# Patient Record
Sex: FEMALE | Race: WHITE
[De-identification: names, ages, dates, MRNs, and addresses within clinical notes are randomized per-mention and may not be internally consistent; named-entity substitution may affect disease eponyms.]

---

## 2017-04-28 ENCOUNTER — HOSPITAL ENCOUNTER (OUTPATIENT)
Dept: HOSPITAL 62 - LC | Age: 22
Discharge: HOME | End: 2017-04-28
Attending: OBSTETRICS & GYNECOLOGY
Payer: MEDICAID

## 2017-04-28 DIAGNOSIS — Z3A.34: ICD-10-CM

## 2017-04-28 DIAGNOSIS — Z36: ICD-10-CM

## 2017-04-28 DIAGNOSIS — Z34.93: Primary | ICD-10-CM

## 2017-04-28 PROCEDURE — 4A1HXCZ MONITORING OF PRODUCTS OF CONCEPTION, CARDIAC RATE, EXTERNAL APPROACH: ICD-10-PCS | Performed by: OBSTETRICS & GYNECOLOGY

## 2017-04-28 PROCEDURE — 59025 FETAL NON-STRESS TEST: CPT

## 2017-05-02 ENCOUNTER — HOSPITAL ENCOUNTER (OUTPATIENT)
Dept: HOSPITAL 62 - LC | Age: 22
Discharge: HOME | End: 2017-05-02
Attending: OBSTETRICS & GYNECOLOGY
Payer: MEDICAID

## 2017-05-02 DIAGNOSIS — Z34.83: Primary | ICD-10-CM

## 2017-05-02 PROCEDURE — 59025 FETAL NON-STRESS TEST: CPT

## 2017-05-31 ENCOUNTER — HOSPITAL ENCOUNTER (OUTPATIENT)
Dept: HOSPITAL 62 - LC | Age: 22
Discharge: HOME | End: 2017-05-31
Attending: OBSTETRICS & GYNECOLOGY
Payer: MEDICAID

## 2017-05-31 DIAGNOSIS — O24.415: Primary | ICD-10-CM

## 2017-05-31 DIAGNOSIS — Z3A.38: ICD-10-CM

## 2017-05-31 LAB
ALBUMIN SERPL-MCNC: 3.5 G/DL (ref 3.5–5)
ALP SERPL-CCNC: 235 U/L (ref 38–126)
ALT SERPL-CCNC: 47 U/L (ref 9–52)
ANION GAP SERPL CALC-SCNC: 8 MMOL/L (ref 5–19)
APPEARANCE UR: (no result)
AST SERPL-CCNC: 30 U/L (ref 14–36)
BARBITURATES UR QL SCN: NEGATIVE
BASOPHILS # BLD AUTO: 0 10^3/UL (ref 0–0.2)
BASOPHILS NFR BLD AUTO: 0.2 % (ref 0–2)
BILIRUB DIRECT SERPL-MCNC: 0.3 MG/DL (ref 0–0.4)
BILIRUB SERPL-MCNC: 0.4 MG/DL (ref 0.2–1.3)
BILIRUB UR QL STRIP: NEGATIVE
BUN SERPL-MCNC: 9 MG/DL (ref 7–20)
CALCIUM: 9.6 MG/DL (ref 8.4–10.2)
CHLORIDE SERPL-SCNC: 106 MMOL/L (ref 98–107)
CO2 SERPL-SCNC: 21 MMOL/L (ref 22–30)
CREAT SERPL-MCNC: 0.68 MG/DL (ref 0.52–1.25)
CREAT UR-MCNC: 77 MG/DL (ref 16–327)
EOSINOPHIL # BLD AUTO: 0.1 10^3/UL (ref 0–0.6)
EOSINOPHIL NFR BLD AUTO: 0.7 % (ref 0–6)
ERYTHROCYTE [DISTWIDTH] IN BLOOD BY AUTOMATED COUNT: 13.8 % (ref 11.5–14)
GLUCOSE SERPL-MCNC: 73 MG/DL (ref 75–110)
GLUCOSE UR STRIP-MCNC: NEGATIVE MG/DL
HCT VFR BLD CALC: 35.7 % (ref 36–47)
HGB BLD-MCNC: 11.7 G/DL (ref 12–15.5)
HGB HCT DIFFERENCE: -0.6
KETONES UR STRIP-MCNC: NEGATIVE MG/DL
LDH1 SERPL-CCNC: 391 U/L (ref 313–618)
LYMPHOCYTES # BLD AUTO: 1.7 10^3/UL (ref 0.5–4.7)
LYMPHOCYTES NFR BLD AUTO: 17.8 % (ref 13–45)
MCH RBC QN AUTO: 30.5 PG (ref 27–33.4)
MCHC RBC AUTO-ENTMCNC: 32.8 G/DL (ref 32–36)
MCV RBC AUTO: 93 FL (ref 80–97)
METHADONE UR QL SCN: NEGATIVE
MONOCYTES # BLD AUTO: 0.7 10^3/UL (ref 0.1–1.4)
MONOCYTES NFR BLD AUTO: 7.4 % (ref 3–13)
NEUTROPHILS # BLD AUTO: 7 10^3/UL (ref 1.7–8.2)
NEUTS SEG NFR BLD AUTO: 73.9 % (ref 42–78)
NITRITE UR QL STRIP: NEGATIVE
PCP UR QL SCN: NEGATIVE
PH UR STRIP: 7 [PH] (ref 5–9)
POTASSIUM SERPL-SCNC: 4.7 MMOL/L (ref 3.6–5)
PROT SERPL-MCNC: 6.7 G/DL (ref 6.3–8.2)
PROT UR STRIP-MCNC: 28 MG/DL (ref ?–12)
PROT UR STRIP-MCNC: NEGATIVE MG/DL
RBC # BLD AUTO: 3.84 10^6/UL (ref 3.72–5.28)
SODIUM SERPL-SCNC: 135.2 MMOL/L (ref 137–145)
SP GR UR STRIP: 1.01
URATE SERPL-MCNC: 5.8 MG/DL (ref 2.5–6.2)
URINE OPIATES LOW: NEGATIVE
UROBILINOGEN UR-MCNC: NEGATIVE MG/DL (ref ?–2)
WBC # BLD AUTO: 9.5 10^3/UL (ref 4–10.5)

## 2017-05-31 PROCEDURE — 83615 LACTATE (LD) (LDH) ENZYME: CPT

## 2017-05-31 PROCEDURE — 4A1HXCZ MONITORING OF PRODUCTS OF CONCEPTION, CARDIAC RATE, EXTERNAL APPROACH: ICD-10-PCS | Performed by: OBSTETRICS & GYNECOLOGY

## 2017-05-31 PROCEDURE — 82570 ASSAY OF URINE CREATININE: CPT

## 2017-05-31 PROCEDURE — 59025 FETAL NON-STRESS TEST: CPT

## 2017-05-31 PROCEDURE — 81001 URINALYSIS AUTO W/SCOPE: CPT

## 2017-05-31 PROCEDURE — 80307 DRUG TEST PRSMV CHEM ANLYZR: CPT

## 2017-05-31 PROCEDURE — 36415 COLL VENOUS BLD VENIPUNCTURE: CPT

## 2017-05-31 PROCEDURE — 84156 ASSAY OF PROTEIN URINE: CPT

## 2017-05-31 PROCEDURE — 80053 COMPREHEN METABOLIC PANEL: CPT

## 2017-05-31 PROCEDURE — 85025 COMPLETE CBC W/AUTO DIFF WBC: CPT

## 2017-05-31 PROCEDURE — 84550 ASSAY OF BLOOD/URIC ACID: CPT

## 2017-05-31 NOTE — NON STRESS TEST REPORT
=================================================================

Non Stress Test

=================================================================

Datetime Report Generated by CPN: 05/31/2017 16:57

   

   

=================================================================

DEMOGRAPHIC

=================================================================

   

EGA NST:  34.4

EGA NST:  34.0

   

=================================================================

INDICATION

=================================================================

   

Indication for Study:  Diabetes Mellitus; Ordered by Provider

Indication for Study:  Diabetes Mellitus

Indication for Study (NST) Other:  on Glyburide

Indication for Study (NST) Other:  GDM on Glyburide

   

=================================================================

VITAL SIGNS

=================================================================

   

Temperature - NST:  98.4

Pulse - NST:  100

RESP - NST:  18

NBPSYS NST:  127

NBPDIA NST:  81

   

=================================================================

MONITORING

=================================================================

   

Monitor Explained:  Monitor Explained; Test Explained; Patient

   Verbalized Understanding

Monitor Explained:  Monitor Explained; Test Explained; Patient

   Verbalized Understanding

Time on Monitor:  05/02/2017 10:41

Time on Monitor:  04/28/2017 16:56

Time off Monitor:  05/02/2017 11:01

Time off Monitor:  04/28/2017 17:09

NST Duration:  20

NST Duration:  13

   

=================================================================

NST INTERVENTIONS

=================================================================

   

NST Interventions:  PO Hydration

NST Interventions:  PO Hydration; For Biophysical Profile

Physician Notified NST:  Dr Mao

Physician Notified NST:   Mohan

BABY A:  [id number]

   

=================================================================

BABY A

=================================================================

   

Fetal Movement :  Present

Fetal Movement :  Present

Contraction Frequency :  none

Contraction Frequency :  occasional

FHR Baseline :  145

FHR Baseline :  150

Accelerations :  15X15

Accelerations :  15X15

Decelerations :  None

Decelerations :  None

Variability :  Moderate 6-25bpm

Variability :  Moderate 6-25bpm

NST Review:  Meets Criteria for Reactive NST

NST Review:  Meets Criteria for Reactive NST

NST Review and Verified By :  TRACEY Dean RN

NST Review and Verified By :  Dr. Preston

NST Results:  Reactive

NST Results:  Reactive

   

=================================================================

NST REPORT

=================================================================

   

Report Trigger:  Send Report

Report Trigger:  Send Report

## 2017-06-06 ENCOUNTER — HOSPITAL ENCOUNTER (INPATIENT)
Dept: HOSPITAL 62 - LC | Age: 22
LOS: 2 days | Discharge: HOME | End: 2017-06-08
Attending: OBSTETRICS & GYNECOLOGY | Admitting: OBSTETRICS & GYNECOLOGY
Payer: MEDICAID

## 2017-06-06 DIAGNOSIS — D62: ICD-10-CM

## 2017-06-06 DIAGNOSIS — Z3A.39: ICD-10-CM

## 2017-06-06 DIAGNOSIS — O34.593: ICD-10-CM

## 2017-06-06 DIAGNOSIS — E66.9: ICD-10-CM

## 2017-06-06 DIAGNOSIS — N85.8: ICD-10-CM

## 2017-06-06 LAB
APPEARANCE UR: (no result)
BARBITURATES UR QL SCN: NEGATIVE
BASOPHILS # BLD AUTO: 0 10^3/UL (ref 0–0.2)
BASOPHILS NFR BLD AUTO: 0.3 % (ref 0–2)
BILIRUB UR QL STRIP: NEGATIVE
EOSINOPHIL # BLD AUTO: 0.1 10^3/UL (ref 0–0.6)
EOSINOPHIL NFR BLD AUTO: 0.4 % (ref 0–6)
ERYTHROCYTE [DISTWIDTH] IN BLOOD BY AUTOMATED COUNT: 14.1 % (ref 11.5–14)
GLUCOSE UR STRIP-MCNC: NEGATIVE MG/DL
HCT VFR BLD CALC: 34.1 % (ref 36–47)
HGB BLD-MCNC: 11.3 G/DL (ref 12–15.5)
HGB HCT DIFFERENCE: -0.2
KETONES UR STRIP-MCNC: NEGATIVE MG/DL
LYMPHOCYTES # BLD AUTO: 1 10^3/UL (ref 0.5–4.7)
LYMPHOCYTES NFR BLD AUTO: 8.4 % (ref 13–45)
MCH RBC QN AUTO: 30.4 PG (ref 27–33.4)
MCHC RBC AUTO-ENTMCNC: 33 G/DL (ref 32–36)
MCV RBC AUTO: 92 FL (ref 80–97)
METHADONE UR QL SCN: NEGATIVE
MONOCYTES # BLD AUTO: 0.6 10^3/UL (ref 0.1–1.4)
MONOCYTES NFR BLD AUTO: 4.6 % (ref 3–13)
NEUTROPHILS # BLD AUTO: 10.7 10^3/UL (ref 1.7–8.2)
NEUTS SEG NFR BLD AUTO: 86.3 % (ref 42–78)
NITRITE UR QL STRIP: NEGATIVE
PCP UR QL SCN: NEGATIVE
PH UR STRIP: 7 [PH] (ref 5–9)
PROT UR STRIP-MCNC: NEGATIVE MG/DL
RBC # BLD AUTO: 3.7 10^6/UL (ref 3.72–5.28)
SP GR UR STRIP: 1.01
URINE OPIATES LOW: NEGATIVE
UROBILINOGEN UR-MCNC: NEGATIVE MG/DL (ref ?–2)
WBC # BLD AUTO: 12.4 10^3/UL (ref 4–10.5)

## 2017-06-06 PROCEDURE — 81005 URINALYSIS: CPT

## 2017-06-06 PROCEDURE — 86850 RBC ANTIBODY SCREEN: CPT

## 2017-06-06 PROCEDURE — 85025 COMPLETE CBC W/AUTO DIFF WBC: CPT

## 2017-06-06 PROCEDURE — 99465 NB RESUSCITATION: CPT

## 2017-06-06 PROCEDURE — 0KQM0ZZ REPAIR PERINEUM MUSCLE, OPEN APPROACH: ICD-10-PCS | Performed by: OBSTETRICS & GYNECOLOGY

## 2017-06-06 PROCEDURE — 4A1HXCZ MONITORING OF PRODUCTS OF CONCEPTION, CARDIAC RATE, EXTERNAL APPROACH: ICD-10-PCS | Performed by: OBSTETRICS & GYNECOLOGY

## 2017-06-06 PROCEDURE — 88307 TISSUE EXAM BY PATHOLOGIST: CPT

## 2017-06-06 PROCEDURE — 86901 BLOOD TYPING SEROLOGIC RH(D): CPT

## 2017-06-06 PROCEDURE — 85027 COMPLETE CBC AUTOMATED: CPT

## 2017-06-06 PROCEDURE — 86592 SYPHILIS TEST NON-TREP QUAL: CPT

## 2017-06-06 PROCEDURE — 80307 DRUG TEST PRSMV CHEM ANLYZR: CPT

## 2017-06-06 PROCEDURE — 86900 BLOOD TYPING SEROLOGIC ABO: CPT

## 2017-06-06 PROCEDURE — 36415 COLL VENOUS BLD VENIPUNCTURE: CPT

## 2017-06-06 RX ADMIN — FERROUS SULFATE TAB 325 MG (65 MG ELEMENTAL FE) SCH MG: 325 (65 FE) TAB at 18:51

## 2017-06-06 RX ADMIN — DOCUSATE SODIUM SCH MG: 100 CAPSULE, LIQUID FILLED ORAL at 18:51

## 2017-06-06 RX ADMIN — IBUPROFEN SCH MG: 800 TABLET, FILM COATED ORAL at 21:05

## 2017-06-06 NOTE — ADMISSION PHYSICAL
=================================================================



=================================================================

Datetime Report Generated by CPN: 2017 17:55

   

   

=================================================================

CURRENT ADMISSION

=================================================================

   

Indication for Induction:  Maternal Diabetes

Admit Plan:  Admit to Unit; Initiate Labor Induction Protocol

   

=================================================================

ALLERGIES

=================================================================

   

Medication Allergies:  No

Medication Allergies:  No Known Allergies (2017)

Medication Allergies:  No Known Allergies (2017)

Medication Allergies:  No Known Allergies (2015)

Latex:  No Latex Allergies

Food Allergies:  None

Environmental Allergies:  None

   

=================================================================

OBSTETRICAL HISTORY

=================================================================

   

EDC:  2017 00:00

:  1

Para:  0

Term:  0

:  0

SAB:  0

IAB:  0

Ectopic:  0

Livin

Cesareans:  0

VBACs:  0

Multiple Births:  0

Gestational Diabetes:  Yes

Rh Sensitization:  No

Incompetent Cervix:  No

POP:  No

Infertility:  No

ART Treatment:  No

Uterine Anomaly:  No

IUGR:  No

Hx Previous C/S:  No

Macrosomia:  No

Hx Loss/Stillborn:  No

PIH:  No

Hx  Death:  No

Placenta Previa/Abruption:  No

Depression/PP Depression:  No

PTL/PROM:  No

Post Partum Hemorrhage:  No

Current Pregnancy Procedures:  Ultrasound; NST

Obstetrical History Comments:  G1 - GDM on glyburide

      

   

=================================================================

***SEE PRENATAL RECORDS***

=================================================================

   

Alcohol:  No

Marijuana :  No

Cocaine:  No

Other Illicit Drugs:  No

Cigarettes:  Never Smoker. [id number]

   

=================================================================

MEDICAL HISTORY

=================================================================

   

Diabetes:  No

Blood Transfusion:  No

Pulmonary Disease (Asthma, TB):  No

Breast Disease:  No

Hypertension:  No

Gyn Surgery:  No

Heart Disease:  No

Hosp/Surgery:  No

Autoimmune Disorder:  No

Anesthetic Complications:  No

Kidney Disease:  No

Abnormal Pap Smear:  No

Neuro/Epilepsy:  No

Psychiatric Disorders:  No

Other Medical Diseases:  No

Hepatitis/Liver Disease:  No

Significant Family History:  No

Varicosities/Phlebitis:  No

Trauma/Violence :  No

Thyroid Dysfunction:  No

   

=================================================================

INFECTIOUS HISTORY

=================================================================

   

Gonorrhea:  No

Genital Herpes:  No

Chlamydia:  No

Tuberculosis:  No

Syphilis:  No

Hepatitis:  No

HIV/AIDS Exposure:  No

Rash or Viral Illness:  No

HPV:  No

   

=================================================================

PHYSICAL EXAM

=================================================================

   

General:  Normal

HEENT:  Normal

Neurologic:  Normal

Thyroid:  Normal

Heart:  Normal

Lungs:  Normal

Breast:  Normal

Back:  Normal

Abdomen:  Normal

Genitourinary Exam:  Normal

Extremities:  Normal

DTRs:  Normal

Pelvic Type:  Adequate

   

=================================================================

VAGINAL EXAM

=================================================================

   

Dilatation:  5

Effacement:  80

Station:  -1

   

=================================================================

MEMBRANES

=================================================================

   

Membranes:  Ruptured

Amniotic Fluid Color:  Clear

   

=================================================================

FETUS A

=================================================================

   

Monitoring:  External US

Fetal Presentation:  Vertex

Admit Comment:  see progress note

   

=================================================================

PLANS FOR LABOR AND DELIVERY

=================================================================

   

Feeding Preference:  Breast

Benefit of Breast Feed Discussed:  Yes

Circumcision:  No

   

=================================================================

INFORMED CONSENT

=================================================================

   

Informed Consent Obtained:  Vaginal Delivery; Induction of Labor;

   Risks, Benefits and Alternatives Discussed

Assignment:  Sharon Preston MD

Signature:  Electronically signed by Ramiro Hercules CNM on 2017 at

   12:06  with User ID: [email or web address]

:  Electronically signed by Ramiro Hercules CNM on 2017 at 12:06  with

   User ID: [email or web address]

## 2017-06-06 NOTE — DELIVERY SUMMARY
=================================================================

Del Sum A-C

=================================================================

Datetime Report Generated by CPN: 2017 17:07

   

   

=================================================================

DELIVERY PERSONNEL

=================================================================

   

DELIVERY PERSONNEL:  [id number]

Delivery Doctor::  Ramiro Hercules CNM

Nurse Midwife Certified::  Ramiro Hercules CNM

Labor and Delivery Nurse::  Holley Samaniego RN

Labor and Delivery Nurse::  CLAUDIA Arciniega/RADHA:  Carmen Camacho CNA II

   

=================================================================

MATERNAL INFORMATION

=================================================================

   

Delivery Anesthesia:  None

Medications After Delivery:  Pitocin Drip 20 Units/1000ml NSS

Estimated  Blood Loss (ml):  300

Provider Comments:  no epidural 

   pt pushing well

   delivery of head

   nuchal x 2 tight

   reduced 

   delivery of infant - boggy uterus

   JEAN

   infant to bed

   nursery nurse called in

   nurses at bedside resuscitating infant

   infant to NICU

   apgar 1/1

   2nd degree vaginal laceration repaired with 1% lidocaine 2.0 chromic

   gut

    cc 

   fundus firm 

   hemostasis achieved

      

   

=================================================================

LABOR SUMMARY

=================================================================

   

EDC:  2017 00:00

No. Babies in Womb:  1

 Attempted:  No

   

=================================================================

LABOR INFORMATION

=================================================================

   

Reason for Induction:  Maternal Diabetes

Onset of Labor:  2017 11:45

Complete Dilatation:  2017 13:45

Oxytocin:  Induction

Group B Beta Strep:  Negative

Steroids Given:  None

Reason Steroids Not Administered:  Not Applicable

   

=================================================================

MEMBRANES

=================================================================

   

Membranes Rupture Method:  Spontaneous

Rupture of Membranes:  2017 11:45

Length of Rupture (hr):  3.47

Amniotic Fluid Color:  Clear

Amniotic Fluid Amount:  Moderate

Amniotic Fluid Odor:  Normal

   

=================================================================

STAGES OF LABOR

=================================================================

   

Stage 1 hr:  2

Stage 1 min:  0

Stage 2 hr:  1

Stage 2 min:  28

Stage 3 hr:  0

Stage 3 min:  2

Total Time in Labor hr:  3

Total Time in Labor min:  30

   

=================================================================

VAGINAL DELIVERY

=================================================================

   

Episiotomy:  None

Laceration Extension:  Second Degree

Laceration Type:  Vaginal

Laceration Repair:  Yes

Sponge Count Correct:  N/A

   

=================================================================

CSECTION DELIVERY

=================================================================

   

Primary Indication:  N/A

Secondary Indication:  N/A

CSection Incidence:  N/A

Labor:  N/A

Elective:  N/A

CSection Incision:  N/A

   

=================================================================

BABY A INFORMATION

=================================================================

   

Infant Delivery Date/Time:  2017 15:13

Method of Delivery:  Vaginal

Born in Route :  No

:  N/A

Forceps:  N/A

Vacuum Extraction:  N/A

Shoulder Dystocia :  No

   

=================================================================

PRESENTATION/POSITION BABY A

=================================================================

   

Presentation:  Cephalic

Cephalic Presentation:  Vertex

Vertex Position:  Left Occipital Anterior

Breech Presentation:  N/A

   

=================================================================

PLACENTA INFORMATION BABY A

=================================================================

   

Placenta Delivery Time :  2017 15:15

Placenta Method of Delivery:  Spontaneous

Placenta Status:  Delivered

   

=================================================================

APGAR SCORES BABY A

=================================================================

   

Heart Rate 1 min:  Slow, Below 100 bpm

Resp Effort 1 min:  Absent

Reflex Irritability 1 min:  No Response

Muscle Tone 1 min:  Flaccid

Color 1 min:  Blue/Pale

Resuscitation Effort 1 min:  Tactile Stimulation

APGAR SCORE 1 MIN:  1

Heart Rate 5 min:  Slow, Below 100 bpm

Resp Effort 5 min:  Absent

Reflex Irritability 5 min:  No Response

Muscle Tone 5 min:  Flaccid

Color 5 min:  Blue/Pale

Resuscitation Effort 5 min:  Tactile Stimulation; Oxygen

APGAR SCORE 5 MIN:  1

Heart Rate 10 min:  >100 bpm

Resp Effort 10 min:  Good Cry

Reflex Irritability 10 min:  Cough or Sneeze or Pulls Away

Muscle Tone 10 min:  Some Flexion of Extremities

Color 10 min:  Body Pink, Extremities Blue

Resuscitation Effort 10 min:  Oxygen; PPV/NCPAP

   (Annotations: Data stored by CPN on behalf of user)

APGAR SCORE 10 MIN:  8

   

=================================================================

INFANT INFORMATION BABY A

=================================================================

   

Gestational Age at Delivery:  39.4

Gestational Status:  Full Term- 39- 40.6 Weeks

Infant Outcome :  Liveborn

Infant Condition :  Stable

Infant Sex:  Male

   

=================================================================

IDENTIFICATION BABY A

=================================================================

   

Infant Verification Date/Time:  2017 15:29

ID Band Number:  [id number]

Mother's Name Verified:  Yes

Infant Medical Record Number:  [id number]

RN Verifying Infant:  D Bellavance RNC/K Aden CNM

   

=================================================================

WEIGHT/LENGTH BABY A

=================================================================

   

Infant Birthweight (gm):  3600

Infant Weight (lb):  7

Infant Weight (oz):  15

   

=================================================================

CORD INFORMATION BABY A

=================================================================

   

No. Cord Vessels:  3

Nuchal Cord :  Around Neck x2, Tight

Cord Blood Taken:  Yes-For Storage (Mom's Blood type +)

Infant Suction:  Mouth; Nose

   

=================================================================

ASSESSMENT BABY A

=================================================================

   

Physical Findings at Delivery:  Within Normal Limits

Skin to Skin:  No

Neonatologist/ALS Called :  Yes

Infant Care By:  K. Aden RNC, A. Babine RN

Transferred To:  NICU

   

=================================================================

BABY B INFORMATION

=================================================================

   

 :  N/A

   

=================================================================

SIGNATURES

=================================================================

   

Assignment:  Sharon Preston MD

Signature:  Electronically signed by Ramiro Hercules CNM on 2017 at

   16:10  with User ID: [email or web address]

:  Electronically signed by Ramiro Hercules CNM on 2017 at 16:10  with

   User ID: [email or web address]

## 2017-06-06 NOTE — L&D PROGRESS NOTES
=================================================================

PROGRESS NOTES

=================================================================

Datetime Report Generated by CPN: 2017 12:05

   

   

=================================================================

PROGRESS NOTE

=================================================================

   

Impression:  Normal Progression of Labor

Plan:  Continue Present Management; Induction

Informed Consent Obtained:  Vaginal Delivery; Induction of Labor;

   Risks, Benefits and Alternatives Discussed

Vital Signs :  Reviewed

Comment:  22 yo  here for induction GDM well controlled with

   glyburide 1.25 mg po bid

   EDC 17

   EGA 39.5

   Obesity

   Late PNC

   pt reports SROM- cervical exam  copious amount of fluid- no

   abdominal tenderness

   vss

   pitocin at 20 milliunits/ min

   plan of care reviewed

   anticipate 

   

=================================================================

VAGINAL EXAM

=================================================================

   

Dilatation:  5

Effacement:  80

Station:  -1

   

=================================================================

MEMBRANES

=================================================================

   

Membranes:  Ruptured

Amniotic Fluid Color:  Clear

   

=================================================================

FETUS A

=================================================================

   

FHR - Baseline:  130

Monitoring:  External US

Variability:  Moderate 6-25bpm

Accelerations:  15X15

Decelerations:  None

FHR Category:  Category I

:  39.5

   

=================================================================

SIGNATURE

=================================================================

   

SIGNATURE:  [id number];[id number]

SIGNATURE:  [id number]

Assignment:  Sharon Preston MD

Signature:  Electronically signed by Ramiro Hercules CNM on 2017 at

   12:05  with User ID: [email or web address]

:  Electronically signed by Ramiro eHrcules CNM on 2017 at 12:05  with

   User ID: [email or web address]

## 2017-06-07 LAB
ERYTHROCYTE [DISTWIDTH] IN BLOOD BY AUTOMATED COUNT: 14.5 % (ref 11.5–14)
HCT VFR BLD CALC: 28.9 % (ref 36–47)
HGB BLD-MCNC: 9.6 G/DL (ref 12–15.5)
HGB HCT DIFFERENCE: -0.1
MCH RBC QN AUTO: 30.4 PG (ref 27–33.4)
MCHC RBC AUTO-ENTMCNC: 33.3 G/DL (ref 32–36)
MCV RBC AUTO: 91 FL (ref 80–97)
RBC # BLD AUTO: 3.17 10^6/UL (ref 3.72–5.28)
WBC # BLD AUTO: 13.4 10^3/UL (ref 4–10.5)

## 2017-06-07 RX ADMIN — FERROUS SULFATE TAB 325 MG (65 MG ELEMENTAL FE) SCH MG: 325 (65 FE) TAB at 18:43

## 2017-06-07 RX ADMIN — IBUPROFEN SCH MG: 800 TABLET, FILM COATED ORAL at 05:16

## 2017-06-07 RX ADMIN — FERROUS SULFATE TAB 325 MG (65 MG ELEMENTAL FE) SCH MG: 325 (65 FE) TAB at 09:30

## 2017-06-07 RX ADMIN — IBUPROFEN SCH MG: 800 TABLET, FILM COATED ORAL at 13:24

## 2017-06-07 RX ADMIN — IBUPROFEN SCH MG: 800 TABLET, FILM COATED ORAL at 21:12

## 2017-06-07 RX ADMIN — PRENATAL W/O A VIT W/ FE FUMARATE-FA CAP 106.5-1 MG SCH CAP: 106.5 CAPSULE ORAL at 09:29

## 2017-06-07 RX ADMIN — SENNOSIDES, DOCUSATE SODIUM SCH EACH: 50; 8.6 TABLET, FILM COATED ORAL at 09:30

## 2017-06-07 RX ADMIN — DOCUSATE SODIUM SCH MG: 100 CAPSULE, LIQUID FILLED ORAL at 09:30

## 2017-06-07 RX ADMIN — DOCUSATE SODIUM SCH MG: 100 CAPSULE, LIQUID FILLED ORAL at 18:43

## 2017-06-07 NOTE — PDOC PROGRESS REPORT
Subjective-OB


Subjective: 


Post Delivery Day: 1








21 year old.  Denies any needs at this time states lochia is stable, pain is 

well controlled, voiding without difficulty.  








Physical Exam (OB)


Vital Signs: 


 











Temp Pulse Resp BP Pulse Ox


 


 97.9 F   82   16   133/80 H  100 


 


 06/07/17 08:08  06/07/17 08:08  06/07/17 08:08  06/07/17 08:08  06/07/17 08:08








 Intake & Output











 06/06/17 06/07/17 06/08/17





 06:59 06:59 06:59


 


Intake Total  600 


 


Balance  600 


 


Weight  106.7 kg 














- PIH/Pre-Eclampsia


Clonus: Negative


Headache: Absent


Epigastric Pain: No


Visual Changes: No





- Lochia


Lochia Amount: Scant < 10 ml


Lochia Color: Rubra/Red





- Abdomen


Description: Tender


Hernia Present: Yes


Fundal Description: Firm, Midline


Fundal Height: u/u - u/2





Objective-Diagnostic


Laboratory: 


 





 06/07/17 06:15 





 











  06/07/17





  06:15


 


WBC  13.4 H


 


RBC  3.17 L


 


Hgb  9.6 L


 


Hct  28.9 L


 


MCV  91


 


MCH  30.4


 


MCHC  33.3


 


RDW  14.5 H


 


Plt Count  167














Assessment and Plan(PN)





- Assessment and Plan


(1) Vaginal delivery


Is this a current diagnosis for this admission?: YesPlan: 


routine pp care








(2) Acute blood loss anemia


Is this a current diagnosis for this admission?: YesPlan: 


ferrous sulfate


increase dietary iron











- Time Spent with Patient


Time with patient: Less than 15 minutes


Critical Time spent with patient: Less than 15 minutes


Medications reviewed and adjusted accordingly: Yes





- Disposition


Anticipated Discharge: Home


Within: within 24 hours

## 2017-06-08 VITALS — DIASTOLIC BLOOD PRESSURE: 73 MMHG | SYSTOLIC BLOOD PRESSURE: 132 MMHG

## 2017-06-08 RX ADMIN — FERROUS SULFATE TAB 325 MG (65 MG ELEMENTAL FE) SCH MG: 325 (65 FE) TAB at 09:25

## 2017-06-08 RX ADMIN — DOCUSATE SODIUM SCH MG: 100 CAPSULE, LIQUID FILLED ORAL at 09:25

## 2017-06-08 RX ADMIN — SENNOSIDES, DOCUSATE SODIUM SCH EACH: 50; 8.6 TABLET, FILM COATED ORAL at 09:25

## 2017-06-08 RX ADMIN — PRENATAL W/O A VIT W/ FE FUMARATE-FA CAP 106.5-1 MG SCH CAP: 106.5 CAPSULE ORAL at 09:25

## 2017-06-08 RX ADMIN — IBUPROFEN SCH MG: 800 TABLET, FILM COATED ORAL at 05:57

## 2017-06-08 NOTE — PDOC DISCHARGE SUMMARY
Final Diagnosis


Discharge Date: 17





- Final Diagnosis


(1) Obesity complicating pregnancy


Is this a current diagnosis for this admission?: Yes





(2) Late prenatal care


Is this a current diagnosis for this admission?: Yes





(3) Gestational diabetes mellitus


Is this a current diagnosis for this admission?: Yes





(4) Vaginal delivery


Is this a current diagnosis for this admission?: Yes





(5) Acute blood loss anemia


Is this a current diagnosis for this admission?: Yes








Discharge Data





- Discharge Medication


Home Medications: 








Prenatal Vit/Iron Fumarate/FA [Prenatal Tablet] 1 tab PO DAILY 17 


Acetaminophen [Tylenol] 325 mg PO PRN PRN 17 


Diphenhydramine HCl [Benadryl] 25 mg PO PRN PRN 17 








Gestational Age: 39.4


Reason(s) for Admission: Induction of Labor, Gestional Diabetes


Prenatal Procedures: NST, Ultrasound


Intrapartum Procedure(s): Spontaneous Vaginal Delivery


Postpartum Complication(s): Laceration-Vaginal


Laceration-Degree: 2nd





-  Data


  ** Baby 1 Male


Apgar at 1 minute: 1


Apgar at 5 minutes: 1


Apgar at 10 minutes: 8


Weight: 3.6 kg


Home with Mother: No


Complications: Yes - baby in NICU





- Diagnosis Test


Laboratory: 


 











Temp Pulse Resp BP Pulse Ox


 


 98.0 F   85   20   132/73 H  99 


 


 17 07:55  17 07:55  17 07:55  17 07:55  17 07:55








 











  17





  07:35 08:14 06:15


 


RBC   3.70 L  3.17 L


 


Hgb   11.3 L  9.6 L


 


Hct   34.1 L  28.9 L


 


Urine Opiates Screen  NEGATIVE  














- Discharge information/Instructions


Discharge Activity: Activity As Tolerated, No Lifting Over 10 Pounds, No Lifting

/Push/Pulling, Pelvic Rest


Discharge Diet: As Tolerated, Regular


Disposition: HOME, SELF-CARE


Follow up with: [facility]


in: 4, Weeks

## 2017-06-08 NOTE — PDOC PROGRESS REPORT
Subjective-OB


Subjective: 


Post Delivery Day:








21 year old.  Denies any needs at this time 


Doing well, breast feeding, baby in NICU, scant bleeding, eating and voiding 

well





Physical Exam (OB)


Vital Signs: 


 











Temp Pulse Resp BP Pulse Ox


 


 98.0 F   85   20   132/73 H  99 


 


 06/08/17 07:55  06/08/17 07:55  06/08/17 07:55  06/08/17 07:55  06/08/17 07:55








 Intake & Output











 06/07/17 06/08/17 06/09/17





 06:59 06:59 06:59


 


Intake Total 600  


 


Balance 600  


 


Weight 106.7 kg  














- PIH/Pre-Eclampsia


Clonus: Negative


Headache: Absent


Epigastric Pain: No


Visual Changes: No





- Lochia


Lochia Amount: Scant < 10 ml


Lochia Color: Rubra/Red





- Abdomen


Description: Tender, Firm, Soft


Hernia Present: No


Fundal Description: Firm, Midline


Fundal Height: u/u - u/2





Objective-Diagnostic


Laboratory: 


 





 06/07/17 06:15 











Assessment and Plan(PN)





- Assessment and Plan


(1) Obesity complicating pregnancy


Qualifiers: 


     Trimester: first trimester        Qualified Code(s): O99.211 - Obesity 

complicating pregnancy, first trimester  


Is this a current diagnosis for this admission?: Yes





(2) Late prenatal care


Is this a current diagnosis for this admission?: Yes





(3) Gestational diabetes mellitus


Qualifiers: 


     Gestational diabetes mellitus control: oral hypoglycemic-controlled


Is this a current diagnosis for this admission?: Yes





(4) Vaginal delivery


Is this a current diagnosis for this admission?: Yes





(5) Acute blood loss anemia


Is this a current diagnosis for this admission?: Yes








- Time Spent with Patient


Time with patient: Less than 15 minutes


Medications reviewed and adjusted accordingly: Yes





- Disposition


Anticipated Discharge: Home


Within: Other - home today

## 2017-08-01 ENCOUNTER — HOSPITAL ENCOUNTER (OUTPATIENT)
Dept: HOSPITAL 62 - OD | Age: 22
End: 2017-08-01
Attending: ADVANCED PRACTICE MIDWIFE
Payer: MEDICAID

## 2017-08-01 DIAGNOSIS — N91.2: Primary | ICD-10-CM

## 2017-08-01 PROCEDURE — 84702 CHORIONIC GONADOTROPIN TEST: CPT

## 2017-08-01 PROCEDURE — 36415 COLL VENOUS BLD VENIPUNCTURE: CPT

## 2019-10-18 ENCOUNTER — HOSPITAL ENCOUNTER (INPATIENT)
Dept: HOSPITAL 62 - LC | Age: 24
LOS: 2 days | Discharge: HOME | End: 2019-10-20
Attending: OBSTETRICS & GYNECOLOGY | Admitting: OBSTETRICS & GYNECOLOGY
Payer: MEDICAID

## 2019-10-18 DIAGNOSIS — O48.0: Primary | ICD-10-CM

## 2019-10-18 DIAGNOSIS — D62: ICD-10-CM

## 2019-10-18 DIAGNOSIS — Z3A.41: ICD-10-CM

## 2019-10-18 DIAGNOSIS — E66.9: ICD-10-CM

## 2019-10-18 LAB
ADD MANUAL DIFF: NO
APPEARANCE UR: (no result)
APTT PPP: YELLOW S
BARBITURATES UR QL SCN: NEGATIVE
BASOPHILS # BLD AUTO: 0 10^3/UL (ref 0–0.2)
BASOPHILS NFR BLD AUTO: 0.3 % (ref 0–2)
BILIRUB UR QL STRIP: NEGATIVE
EOSINOPHIL # BLD AUTO: 0 10^3/UL (ref 0–0.6)
EOSINOPHIL NFR BLD AUTO: 0.3 % (ref 0–6)
ERYTHROCYTE [DISTWIDTH] IN BLOOD BY AUTOMATED COUNT: 14 % (ref 11.5–14)
GLUCOSE UR STRIP-MCNC: NEGATIVE MG/DL
HCT VFR BLD CALC: 32.9 % (ref 36–47)
HGB BLD-MCNC: 10.9 G/DL (ref 12–15.5)
KETONES UR STRIP-MCNC: NEGATIVE MG/DL
LYMPHOCYTES # BLD AUTO: 1.2 10^3/UL (ref 0.5–4.7)
LYMPHOCYTES NFR BLD AUTO: 10.9 % (ref 13–45)
MCH RBC QN AUTO: 29.5 PG (ref 27–33.4)
MCHC RBC AUTO-ENTMCNC: 33.3 G/DL (ref 32–36)
MCV RBC AUTO: 89 FL (ref 80–97)
METHADONE UR QL SCN: NEGATIVE
MONOCYTES # BLD AUTO: 0.8 10^3/UL (ref 0.1–1.4)
MONOCYTES NFR BLD AUTO: 7.1 % (ref 3–13)
NEUTROPHILS # BLD AUTO: 9.3 10^3/UL (ref 1.7–8.2)
NEUTS SEG NFR BLD AUTO: 81.4 % (ref 42–78)
NITRITE UR QL STRIP: NEGATIVE
PCP UR QL SCN: NEGATIVE
PH UR STRIP: 6 [PH] (ref 5–9)
PLATELET # BLD: 248 10^3/UL (ref 150–450)
PROT UR STRIP-MCNC: 30 MG/DL
RBC # BLD AUTO: 3.7 10^6/UL (ref 3.72–5.28)
SP GR UR STRIP: 1.02
TOTAL CELLS COUNTED % (AUTO): 100 %
URINE AMPHETAMINES SCREEN: NEGATIVE
URINE BENZODIAZEPINES SCREEN: NEGATIVE
URINE COCAINE SCREEN: NEGATIVE
URINE MARIJUANA (THC) SCREEN: NEGATIVE
UROBILINOGEN UR-MCNC: 2 MG/DL (ref ?–2)
WBC # BLD AUTO: 11.4 10^3/UL (ref 4–10.5)

## 2019-10-18 PROCEDURE — 86850 RBC ANTIBODY SCREEN: CPT

## 2019-10-18 PROCEDURE — 86592 SYPHILIS TEST NON-TREP QUAL: CPT

## 2019-10-18 PROCEDURE — 10907ZC DRAINAGE OF AMNIOTIC FLUID, THERAPEUTIC FROM PRODUCTS OF CONCEPTION, VIA NATURAL OR ARTIFICIAL OPENING: ICD-10-PCS | Performed by: OBSTETRICS & GYNECOLOGY

## 2019-10-18 PROCEDURE — 0HQ9XZZ REPAIR PERINEUM SKIN, EXTERNAL APPROACH: ICD-10-PCS | Performed by: OBSTETRICS & GYNECOLOGY

## 2019-10-18 PROCEDURE — 81005 URINALYSIS: CPT

## 2019-10-18 PROCEDURE — 85027 COMPLETE CBC AUTOMATED: CPT

## 2019-10-18 PROCEDURE — 80307 DRUG TEST PRSMV CHEM ANLYZR: CPT

## 2019-10-18 PROCEDURE — 86901 BLOOD TYPING SEROLOGIC RH(D): CPT

## 2019-10-18 PROCEDURE — 86900 BLOOD TYPING SEROLOGIC ABO: CPT

## 2019-10-18 PROCEDURE — 36415 COLL VENOUS BLD VENIPUNCTURE: CPT

## 2019-10-18 PROCEDURE — 85025 COMPLETE CBC W/AUTO DIFF WBC: CPT

## 2019-10-18 NOTE — ADMISSION PHYSICAL
=================================================================



=================================================================

Datetime Report Generated by CPN: 10/18/2019 21:54

   

   

=================================================================

CURRENT ADMISSION

=================================================================

   

Chief Complaint:  Uterine Contractions

Indication for Induction:  Not Applicable

Admit Impression :  Term, Intrauterine Pregnancy

Admit Plan:  Admit to Unit; Initiate Labor Protocol

   

=================================================================

ALLERGIES

=================================================================

   

Medication Allergies:  No

Medication Allergies:  No Known Allergies (2017)

Latex:  No Latex Allergies

   

=================================================================

OBSTETRICAL HISTORY

=================================================================

   

EDC:  10/11/2019 00:00

:  2

Para:  1

Term:  1

:  0

SAB:  0

IAB:  0

Livin

Gestational Diabetes:  Yes

Rh Sensitization:  No

Incompetent Cervix:  No

POP:  No

Infertility:  No

ART Treatment:  No

Uterine Anomaly:  No

IUGR:  No

Hx Previous C/S:  No

Macrosomia:  No

Hx Loss/Stillborn:  No

PIH:  No

Hx  Death:  No

Placenta Previa/Abruption:  No

Depression/PP Depression:  No

PTL/PROM:  No

Post Partum Hemorrhage:  No

Current Pregnancy Procedures:  Ultrasound; NST

Obstetrical History Comments:  g1- GDM, , male, 

      

   

=================================================================

***SEE PRENATAL RECORDS***

=================================================================

   

Alcohol:  No

Marijuana :  No

Cocaine:  No

Other Illicit Drugs:  No

Cigarettes:  Never Smoker. [id number]

   

=================================================================

MEDICAL HISTORY

=================================================================

   

Diabetes:  Yes

Diabetes Type:  Gestational Diabetes

Blood Transfusion:  No

Pulmonary Disease (Asthma, TB):  No

Breast Disease:  No

Hypertension:  No

Gyn Surgery:  No

Heart Disease:  No

Hosp/Surgery:  Yes

Autoimmune Disorder:  No

Anesthetic Complications:  No

Kidney Disease:  No

Abnormal Pap Smear:  No

Neuro/Epilepsy:  No

Psychiatric Disorders:  No

Other Medical Diseases:  No

Hepatitis/Liver Disease:  No

Significant Family History:  No

Varicosities/Phlebitis:  No

Trauma/Violence :  No

Thyroid Dysfunction:  No

Medical History Comments:  childbirth x 1 

   

=================================================================

INFECTIOUS HISTORY

=================================================================

   

Gonorrhea:  No

Genital Herpes:  No

Chlamydia:  No

Tuberculosis:  No

Syphilis:  No

Hepatitis:  No

HIV/AIDS Exposure:  No

Rash or Viral Illness:  No

HPV:  No

   

=================================================================

PHYSICAL EXAM

=================================================================

   

General:  Normal

HEENT:  Normal

Neurologic:  Normal

Thyroid:  Normal

Heart:  Normal

Lungs:  Normal

Breast:  Deferred

Back:  Normal

Abdomen:  Normal

Genitourinary Exam:  Normal

Extremities:  Normal

DTRs:  Normal

Pelvic Type:  Adequate

Vital Signs:  Reviewed

   

=================================================================

VAGINAL EXAM

=================================================================

   

Dilatation:  5

Effacement:  90

Station:  -2

   

=================================================================

MEMBRANES

=================================================================

   

Pooling:  Positive

Membranes:  Intact

   

=================================================================

FETUS A

=================================================================

   

EGA:  41.0

Monitoring:  External US

FHR- Baseline:  140

Variability:  Moderate 6-25bpm

Decelerations:  None

FHR Category:  Category I

Fetal Presentation:  Vertex

Admit Comment:  admit for labor

   

=================================================================

PLANS FOR LABOR AND DELIVERY

=================================================================

   

Labor and Delivery:  Birth Plan

Pain Management:  Natural

Feeding Preference:  Breast

Benefit of Breast Feed Discussed:  Yes

Circumcision:  N/A

   

=================================================================

INFORMED CONSENT

=================================================================

   

Signature:  Electronically signed by Sharon rPeston MD ([email or web address]) on

   10/18/2019 at 21:54  with User ID: [id number]

## 2019-10-19 LAB
ERYTHROCYTE [DISTWIDTH] IN BLOOD BY AUTOMATED COUNT: 14.2 % (ref 11.5–14)
HCT VFR BLD CALC: 29.1 % (ref 36–47)
HGB BLD-MCNC: 9.6 G/DL (ref 12–15.5)
MCH RBC QN AUTO: 29.4 PG (ref 27–33.4)
MCHC RBC AUTO-ENTMCNC: 32.9 G/DL (ref 32–36)
MCV RBC AUTO: 89 FL (ref 80–97)
PLATELET # BLD: 197 10^3/UL (ref 150–450)
RBC # BLD AUTO: 3.26 10^6/UL (ref 3.72–5.28)
WBC # BLD AUTO: 11.2 10^3/UL (ref 4–10.5)

## 2019-10-19 RX ADMIN — IBUPROFEN SCH: 800 TABLET, FILM COATED ORAL at 16:18

## 2019-10-19 RX ADMIN — DOCUSATE SODIUM SCH MG: 100 CAPSULE, LIQUID FILLED ORAL at 18:05

## 2019-10-19 RX ADMIN — DOCUSATE SODIUM SCH MG: 100 CAPSULE, LIQUID FILLED ORAL at 09:43

## 2019-10-19 RX ADMIN — Medication SCH CAP: at 09:43

## 2019-10-19 RX ADMIN — SENNOSIDES, DOCUSATE SODIUM SCH EACH: 50; 8.6 TABLET, FILM COATED ORAL at 09:43

## 2019-10-19 RX ADMIN — IBUPROFEN SCH: 800 TABLET, FILM COATED ORAL at 06:54

## 2019-10-19 RX ADMIN — FAMOTIDINE SCH MG: 20 TABLET, FILM COATED ORAL at 09:43

## 2019-10-19 RX ADMIN — FAMOTIDINE SCH MG: 20 TABLET, FILM COATED ORAL at 21:50

## 2019-10-19 RX ADMIN — IBUPROFEN SCH MG: 800 TABLET, FILM COATED ORAL at 21:50

## 2019-10-19 RX ADMIN — FERROUS SULFATE TAB 325 MG (65 MG ELEMENTAL FE) SCH MG: 325 (65 FE) TAB at 18:05

## 2019-10-19 RX ADMIN — FERROUS SULFATE TAB 325 MG (65 MG ELEMENTAL FE) SCH MG: 325 (65 FE) TAB at 09:43

## 2019-10-19 RX ADMIN — FAMOTIDINE SCH: 20 TABLET, FILM COATED ORAL at 05:44

## 2019-10-19 NOTE — PDOC PROGRESS REPORT
Subjective-OB


Progress Note for:: 10/19/19


Subjective: 


Pt doing well, no concerns. She reports light bleeding, reg diet and voiding 

without difficulty.








Physical Exam (OB)


Vital Signs: 


                                        











Temp Pulse Resp BP Pulse Ox


 


 98.2 F   86   16   121/75   100 


 


 10/19/19 07:39  10/19/19 07:39  10/19/19 07:39  10/19/19 07:39  10/19/19 07:39








                                 Intake & Output











 10/18/19 10/19/19 10/20/19





 06:59 06:59 06:59


 


Weight  108.6 kg 














- Lochia


Lochia Amount: Small 10-25 ml


Lochia Color: Rubra/Red





- Abdomen


Description: Soft


Hernia Present: No


Fundal Description: Firm, Midline


Fundal Height: u/u - u/2





Objective-Diagnostic


Laboratory: 


                                        





                                 10/19/19 07:43 





                                        











  10/18/19 10/18/19 10/18/19





  21:21 21:42 21:42


 


WBC   11.4 H 


 


RBC   3.70 L 


 


Hgb   10.9 L 


 


Hct   32.9 L 


 


MCV   89 


 


MCH   29.5 


 


MCHC   33.3 


 


RDW   14.0 


 


Plt Count   248 


 


Seg Neutrophils %   81.4 H 


 


Urine Color  YELLOW  


 


Urine Appearance  SLIGHTLY-CLOUDY  


 


Urine pH  6.0  


 


Ur Specific Gravity  1.019  


 


Urine Protein  30 H  


 


Urine Glucose (UA)  NEGATIVE  


 


Urine Ketones  NEGATIVE  


 


Urine Blood  SMALL H  


 


Urine Nitrite  NEGATIVE  


 


Ur Leukocyte Esterase  SMALL H  


 


Blood Type    A POSITIVE


 


Antibody Screen    NEGATIVE














  10/19/19





  07:43


 


WBC  11.2 H


 


RBC  3.26 L


 


Hgb  9.6 L


 


Hct  29.1 L


 


MCV  89


 


MCH  29.4


 


MCHC  32.9


 


RDW  14.2 H


 


Plt Count  197


 


Seg Neutrophils % 


 


Urine Color 


 


Urine Appearance 


 


Urine pH 


 


Ur Specific Gravity 


 


Urine Protein 


 


Urine Glucose (UA) 


 


Urine Ketones 


 


Urine Blood 


 


Urine Nitrite 


 


Ur Leukocyte Esterase 


 


Blood Type 


 


Antibody Screen 














Assessment and Plan(PN)





- Assessment and Plan


(1) Acute blood loss anemia


Is this a current diagnosis for this admission?: Yes   





(2) Gestational diabetes mellitus


Qualifiers: 


   Gestational diabetes mellitus control: unspecified   Trimester: third 

trimester   Qualified Code(s): O24.419 - Gestational diabetes mellitus in 

pregnancy, unspecified control   


Is this a current diagnosis for this admission?: Yes   





(3) Late prenatal care


Is this a current diagnosis for this admission?: Yes   





(4) Obesity complicating pregnancy


Qualifiers: 


   Trimester: unspecified trimester   Qualified Code(s): O99.210 - Obesity 

complicating pregnancy, unspecified trimester   


Is this a current diagnosis for this admission?: Yes   





(5) Vaginal delivery


Is this a current diagnosis for this admission?: Yes   





- Time Spent with Patient


Time with patient: Less than 15 minutes


Medications reviewed and adjusted accordingly: Yes





- Disposition


Anticipated Discharge: Home


Within: within 24 hours

## 2019-10-20 VITALS — DIASTOLIC BLOOD PRESSURE: 85 MMHG | SYSTOLIC BLOOD PRESSURE: 135 MMHG

## 2019-10-20 RX ADMIN — Medication SCH CAP: at 11:18

## 2019-10-20 RX ADMIN — IBUPROFEN SCH MG: 800 TABLET, FILM COATED ORAL at 05:49

## 2019-10-20 RX ADMIN — DOCUSATE SODIUM SCH MG: 100 CAPSULE, LIQUID FILLED ORAL at 11:19

## 2019-10-20 RX ADMIN — FERROUS SULFATE TAB 325 MG (65 MG ELEMENTAL FE) SCH MG: 325 (65 FE) TAB at 11:18

## 2019-10-20 RX ADMIN — SENNOSIDES, DOCUSATE SODIUM SCH EACH: 50; 8.6 TABLET, FILM COATED ORAL at 11:19

## 2019-10-20 RX ADMIN — FAMOTIDINE SCH MG: 20 TABLET, FILM COATED ORAL at 11:19

## 2019-10-20 NOTE — PDOC DISCHARGE SUMMARY
Impression





- Admit/DC Date/PCP


Admission Date/Primary Care Provider: 


  10/18/19 21:31





  EB BARAHONA MD





Discharge Date: 10/20/19





- Discharge Diagnosis


(1) Acute blood loss anemia


Is this a current diagnosis for this admission?: Yes   





(2) Gestational diabetes mellitus


Is this a current diagnosis for this admission?: Yes   





(3) Late prenatal care


Is this a current diagnosis for this admission?: Yes   





(4) Obesity complicating pregnancy


Is this a current diagnosis for this admission?: Yes   





(5) Vaginal delivery


Is this a current diagnosis for this admission?: Yes   





- Additional Information


Resuscitation Status: Full Code


Discharge Diet: Regular


Discharge Activity: Balance Activity w/Rest, Pelvic Rest


Referrals: 


EB BARAHONA MD [Primary Care Provider] - 


Prescriptions: 


Ibuprofen [Motrin 800 mg Tablet] 800 mg PO Q8HP PRN #60 tablet


 PRN Reason: 


Home Medications: 








Prenatal Vit/Iron Fum/Folic AC [Prenatal Tablet] 1 tab PO DAILY 04/28/17 


Ibuprofen [Motrin 800 mg Tablet] 800 mg PO Q8HP PRN #60 tablet 10/20/19 











Results


Laboratory Results: 


                                        











WBC  11.2 10^3/uL (4.0-10.5)  H  10/19/19  07:43    


 


RBC  3.26 10^6/uL (3.72-5.28)  L  10/19/19  07:43    


 


Hgb  9.6 g/dL (12.0-15.5)  L  10/19/19  07:43    


 


Hct  29.1 % (36.0-47.0)  L  10/19/19  07:43    


 


MCV  89 fl (80-97)   10/19/19  07:43    


 


MCH  29.4 pg (27.0-33.4)   10/19/19  07:43    


 


MCHC  32.9 g/dL (32.0-36.0)   10/19/19  07:43    


 


RDW  14.2 % (11.5-14.0)  H  10/19/19  07:43    


 


Plt Count  197 10^3/uL (150-450)   10/19/19  07:43    


 


Lymph % (Auto)  10.9 % (13-45)  L  10/18/19  21:42    


 


Mono % (Auto)  7.1 % (3-13)   10/18/19  21:42    


 


Eos % (Auto)  0.3 % (0-6)   10/18/19  21:42    


 


Baso % (Auto)  0.3 % (0-2)   10/18/19  21:42    


 


Absolute Neuts (auto)  9.3 10^3/uL (1.7-8.2)  H  10/18/19  21:42    


 


Absolute Lymphs (auto)  1.2 10^3/uL (0.5-4.7)   10/18/19  21:42    


 


Absolute Monos (auto)  0.8 10^3/uL (0.1-1.4)   10/18/19  21:42    


 


Absolute Eos (auto)  0.0 10^3/uL (0.0-0.6)   10/18/19  21:42    


 


Absolute Basos (auto)  0.0 10^3/uL (0.0-0.2)   10/18/19  21:42    


 


Seg Neutrophils %  81.4 % (42-78)  H  10/18/19  21:42    


 


Urine Color  YELLOW   10/18/19  21:21    


 


Urine Appearance  SLIGHTLY-CLOUDY   10/18/19  21:21    


 


Urine pH  6.0  (5.0-9.0)   10/18/19  21:21    


 


Ur Specific Gravity  1.019   10/18/19  21:21    


 


Urine Protein  30 mg/dL (NEGATIVE)  H  10/18/19  21:21    


 


Urine Glucose (UA)  NEGATIVE mg/dL (NEGATIVE)   10/18/19  21:21    


 


Urine Ketones  NEGATIVE mg/dL (NEGATIVE)   10/18/19  21:21    


 


Urine Blood  SMALL  (NEGATIVE)  H  10/18/19  21:21    


 


Urine Nitrite  NEGATIVE  (NEGATIVE)   10/18/19  21:21    


 


Urine Bilirubin  NEGATIVE  (NEGATIVE)   10/18/19  21:21    


 


Urine Urobilinogen  2.0 mg/dL (<2.0)  H  10/18/19  21:21    


 


Ur Leukocyte Esterase  SMALL  (NEGATIVE)  H  10/18/19  21:21    


 


Urine Ascorbic Acid  NEGATIVE  (NEGATIVE)   10/18/19  21:21    


 


Urine Opiates Screen  NEGATIVE   10/18/19  21:21    


 


Urine Methadone Screen  NEGATIVE   10/18/19  21:21    


 


Ur Barbiturates Screen  NEGATIVE   10/18/19  21:21    


 


Ur Phencyclidine Scrn  NEGATIVE   10/18/19  21:21    


 


Ur Amphetamines Screen  NEGATIVE   10/18/19  21:21    


 


U Benzodiazepines Scrn  NEGATIVE   10/18/19  21:21    


 


Urine Cocaine Screen  NEGATIVE   10/18/19  21:21    


 


U Marijuana (THC) Screen  NEGATIVE   10/18/19  21:21    


 


Blood Type  A POSITIVE   10/18/19  21:42    


 


Antibody Screen  NEGATIVE   10/18/19  21:42